# Patient Record
Sex: FEMALE | HISPANIC OR LATINO | Employment: FULL TIME | URBAN - NONMETROPOLITAN AREA
[De-identification: names, ages, dates, MRNs, and addresses within clinical notes are randomized per-mention and may not be internally consistent; named-entity substitution may affect disease eponyms.]

---

## 2017-01-26 ENCOUNTER — OFFICE VISIT (OUTPATIENT)
Dept: MEDICAL GROUP | Facility: PHYSICIAN GROUP | Age: 42
End: 2017-01-26
Payer: COMMERCIAL

## 2017-01-26 ENCOUNTER — TELEPHONE (OUTPATIENT)
Dept: MEDICAL GROUP | Facility: PHYSICIAN GROUP | Age: 42
End: 2017-01-26

## 2017-01-26 VITALS
HEART RATE: 97 BPM | RESPIRATION RATE: 16 BRPM | BODY MASS INDEX: 22.2 KG/M2 | OXYGEN SATURATION: 99 % | SYSTOLIC BLOOD PRESSURE: 158 MMHG | HEIGHT: 64 IN | TEMPERATURE: 97.5 F | WEIGHT: 130 LBS | DIASTOLIC BLOOD PRESSURE: 110 MMHG

## 2017-01-26 DIAGNOSIS — I10 ESSENTIAL HYPERTENSION: ICD-10-CM

## 2017-01-26 PROCEDURE — 99214 OFFICE O/P EST MOD 30 MIN: CPT | Performed by: NURSE PRACTITIONER

## 2017-01-26 RX ORDER — LISINOPRIL 10 MG/1
10 TABLET ORAL DAILY
Qty: 30 TAB | Refills: 2 | Status: SHIPPED | OUTPATIENT
Start: 2017-01-26 | End: 2017-02-24

## 2017-01-26 ASSESSMENT — PATIENT HEALTH QUESTIONNAIRE - PHQ9: CLINICAL INTERPRETATION OF PHQ2 SCORE: 0

## 2017-01-26 NOTE — Clinical Note
January 26, 2017         Patient: Olimpia Dent   YOB: 1975   Date of Visit: 1/26/2017           To Whom it May Concern:    Olimpia Dent was seen in my clinic on 1/26/2017. She may return to work on 1/27/17..    If you have any questions or concerns, please don't hesitate to call.        Sincerely,           GRICELDA Frey.  Electronically Signed

## 2017-01-26 NOTE — ASSESSMENT & PLAN NOTE
This is a 41-year-old Guatemalan-speaking female, the visit is conducted with [facility] .     She was diagnosed with hypertension while she was still living in [place], she cannot recall which medication she was on but it did control her blood pressure well. She stopped taking these medications a few years ago when she moved to the , she was not sure that she needed to continue taking them. She has been having some shortness of breath that has been intermittent, not associated with physical activity, cough, CP.

## 2017-01-26 NOTE — PATIENT INSTRUCTIONS
Lisinopril tablets  ¿Qué es stefan medicamento?  El LISINOPRIL es un inhibidor de la ECA. Stefan medicamento se utiliza para el tratamiento de la alina presión sanguínea o insuficiencia cardiaca. También se utiliza para proteger el corazón inmediatamente después de un ataque cardíaco.  Stefan medicamento puede ser utilizado para otros usos; si tiene alguna pregunta consulte con escamilla proveedor de atención médica o con escamilla farmacéutico.  MARCAS COMERCIALES DISPONIBLES: Prinivil, Zestril  ¿Qué le lance informar a mi profesional de la renea antes de letha stefan medicamento?  Necesita saber si usted presenta alguno de los siguientes problemas o situaciones:  -diabetes  -enfermedad cardiaca o vascular  -enfermedad del sistema inmunológico, esther lupus o esclerodermia  -enfermedad renal  -presión sanguínea baja  -hinchazón previa de la lengua, cecile o labios con dificultad al respirar o tragar, ronquera o estrechamiento de la garganta  -ingrid reacción alérgica o inusual al lisinopril, a otros inhibidores de la ECA, veneno del insecto, alimentos, colorantes o conservantes  -si está embarazada o buscando quedar embarazada  -si está amamantando a un bebé  ¿Cómo lance utilizar stefan medicamento?  [place] stefan medicamento por vía oral con un vaso de agua. Siga las instrucciones de la etiqueta del medicamento. Stefan medicamento se puede letha con o sin alimentos. [place] charmaine dosis a intervalos regulares. No deje de letha stefan medicamento excepto si así lo indica escamilla médico o escamilla profesional de la renea.  Hable con escamilla pediatra para informarse acerca del uso de stefan medicamento en niños. Puede requerir atención especial. Aunque stefan medicamento ha sido recetado a niños tan menores esther de 6 años de edad para condiciones selectivas, las precauciones se aplican.  Sobredosis: Póngase en contacto inmediatamente con un centro toxicológico o ingrid lakisha de urgencia si usted evan que haya tomado demasiado medicamento.  ATENCIÓN: Stefan medicamento es solo para usted.  No comparta stefan medicamento con nadie.  ¿Qué sucede si me olvido de ingrid dosis?  Si olvida ingrid dosis, tómela lo antes posible. Si es anyi la hora de la próxima dosis, tome sólo felipe dosis. No tome dosis adicionales o dobles.  ¿Qué puede interactuar con stefan medicamento?  -diuréticos  -litio  -los LEDA, medicamentos para el dolor o la inflamación, esthre ibuprofeno o naproxeno  -suplementos a base de hierba de venta haja, yonas esther barrera abbott  -suplementos o sales de potasio  -sustitutos de la sal  Puede ser que esta lista no menciona todas las posibles interacciones. Informe a escamilla profesional de la renea de todos los productos a base de hierbas, medicamentos de venta haja o suplementos nutritivos que esté tomando. Si usted fuma, consume bebidas alcohólicas o si utiliza drogas ilegales, indíqueselo también a escamilla profesional de la renea. Algunas sustancias pueden interactuar con escamilla medicamento.  ¿A qué lance estar atento al usar stefan medicamento?  Visite a escamilla médico o a escamilla profesional de la renea para chequear escamilla evolución periódicamente. Controle escamilla presión sanguínea esther le haya indicado. Pregunte a escamilla médico cuál debe ser escamilla presión sanguínea y cuándo deberá comunicarse con él o lam. Comuníquese con escamilla médico o con escamilla profesional de la renea si observa que escamilla pulso cardiaco es rápido o irregular.  Las mujeres deben informar a escamilla médico si están buscando quedar embarazadas o si creen que están embarazadas. Existe la posibilidad de efectos secundarios graves al bebé sin nacer. Para más información hable con escamilla profesional de la renea o escamilla farmacéutico.  Si sufre ataques severos de diarrea, náuseas, vómito o si suda demasiado, consulte a escamilla médico o a escamilla profesional de la renea. La pérdida de líquidos corporales puede hacer que sea peligroso letha stefan medicamento.  Puede experimentar mareos o somnolencia. No conduzca ni utilice maquinaria, ni pepper nada que le exija permanecer en estado de alerta hasta que sepa cómo le  afecta stefan medicamento. No se siente ni se ponga de pie con rapidez, especialmente si es un paciente de edad avanzada. [place] reduce el riesgo de mareos o desmayos. El alcohol puede aumentar los mareos y la somnolencia. Evite consumir bebidas alcohólicas.  Evite los sustitutos de la sal a menos que escamilla médico o escamilla profesional de la renea indique lo contrario.  No se trate usted mismo si tiene tos, resfrío o marion mientras esté tomando stefan medicamento sin consultar a escamilla médico o a escamilla profesional de la renea. Algunos ingredientes pueden aumentar escamilla presión sanguínea.  ¿Qué efectos secundarios puedo tener al utilizar stefan medicamento?  Efectos secundarios que debe informar a escamilla médico o a escamilla profesional de la renea tan pronto esther sea posible:  -dolor abdominal con o sin náuseas o vómito  -reacciones alérgicas esther erupción cutánea o urticarias, hinchazón de la cecile, choco, pies, labios, garganta o lengua  -orina de color amarillo oscuro  -dificultad al respirar  -mareos, aturdimiento o desmayos  -fiebre o dolor de garganta  -pulso cardiaco irregular, dolor en el pecho  -dolor o dificultad para orinar  -enrojecimiento, formación de ampollas, descamación o distensión de la piel, inclusive dentro de la boca  -cansancio inusual  -color amarillento de los ojos o la piel  Efectos secundarios que, por lo general, no requieren atención médica (debe informarlos a escamilla médico o a escamilla profesional de la renea si persisten o si son molestos):  -cambios en el sentido del gusto  -tos  -disminución de la función o deseo sexual  -dolor de veronica  -sensibilidad al sol  -cansancio  Puede ser que esta lista no menciona todos los posibles efectos secundarios. Comuníquese a escamilla médico por asesoramiento médico sobre los efectos secundarios. Usted puede informar los efectos secundarios a la FDA por teléfono al [phone]-[phone].  ¿Dónde lance guardar mi medicina?  [place]ala fuera del alcance de los niños.  Guárdela a temperatura ambiente, entre 15  y 30 grados C (59 y 86 grados F). Protéjala de la humedad. Mantenga el envase israel cerrado. Deseche todo el medicamento que no haya utilizado, después de la fecha de vencimiento.  ATENCIÓN: Anabela folleto es un resumen. Puede ser que no cubra toda la posible información. Si usted tiene preguntas acerca de esta medicina, consulte con escamilla médico, escamilla farmacéutico o escamilla profesional de la renea.  © 2014, Elsevier/Gold Standard. (4/9/2007 3:31:00 PM)

## 2017-01-26 NOTE — MR AVS SNAPSHOT
"        Olimpia Dent   2017 2:20 PM   Office Visit   MRN: [id number]    Department:  [facility]   Dept Phone:  [phone]    Description:  Female : 1975   Provider:  JADEN Frey           Reason for Visit     New Patient est care       Allergies as of 2017     No Known Allergies      You were diagnosed with     Essential hypertension   [[id number]]         Vital Signs     Blood Pressure Pulse Temperature Respirations Height Weight    158/110 mmHg 97 36.4 °C (97.5 °F) 16 1.626 m (5' 4.02\") 58.968 kg (130 lb)    Body Mass Index Oxygen Saturation Last Menstrual Period Breastfeeding? Smoking Status       22.30 kg/m2 99% 2017 No Never Smoker        Basic Information     Date Of Birth Sex Race Ethnicity Preferred Language    1975 Female Unable to Obtain Unknown English      Your appointments     Feb 10, 2017 10:45 AM   Established Patient with JEROME SAUNDERS   [facility]    [address] [place] [id number] [phone]           You will be receiving a confirmation call a few days before your appointment from our automated call confirmation system.            2017  1:00 PM   Established Patient with JADNE Frey   [facility])    [address] [place] [id number] [phone]           You will be receiving a confirmation call a few days before your appointment from our automated call confirmation system.              Health Maintenance        Date Due Completion Dates    IMM DTaP/Tdap/Td Vaccine (1 - Tdap) 1994 ---    PAP SMEAR 1996 ---    MAMMOGRAM 2015 ---    IMM INFLUENZA (1) 2016 ---            Current Immunizations     No immunizations on file.      Below and/or attached are the medications your provider expects you to take. Review all of your home medications and newly ordered medications with your provider and/or pharmacist. Follow " medication instructions as directed by your provider and/or pharmacist. Please keep your medication list with you and share with your provider. Update the information when medications are discontinued, doses are changed, or new medications (including over-the-counter products) are added; and carry medication information at all times in the event of emergency situations     Allergies:  No Known Allergies          Medications  Valid as of: January 26, 2017 -  2:56 PM    Generic Name Brand Name Tablet Size Instructions for use    Benzonatate (Cap) TESSALON 200 MG Take 1 Cap by mouth 3 times a day as needed for Cough.        Lisinopril (Tab) PRINIVIL 10 MG Take 1 Tab by mouth every day.        .                 Medicines prescribed today were sent to:     [facility] [address] [place] [address], [place] [address] [facility] [address] [place] [address] & [place]    [address] [facility] [address] [place] [address]    Phone: [phone] Fax: [phone]    Open 24 Hours?: No      Medication refill instructions:       If your prescription bottle indicates you have medication refills left, it is not necessary to call your provider’s office. Please contact your pharmacy and they will refill your medication.    If your prescription bottle indicates you do not have any refills left, you may request refills at any time through one of the following ways: The online [employer] system (except Urgent Care), by calling your provider’s office, or by asking your pharmacy to contact your provider’s office with a refill request. Medication refills are processed only during regular business hours and may not be available until the next business day. Your provider may request additional information or to have a follow-up visit with you prior to refilling your medication.   *Please Note: Medication refills are assigned a new Rx number when refilled electronically. Your pharmacy may indicate that no refills were authorized even though a new prescription for  the same medication is available at the pharmacy. Please request the medicine by name with the pharmacy before contacting your provider for a refill.        Your To Do List     Future Labs/Procedures Complete By Expires    COMP METABOLIC PANEL  As directed 1/27/2018    TSH  As directed 1/27/2018      Instructions    Lisinopril tablets  ¿Qué es stefan medicamento?  El LISINOPRIL es un inhibidor de la ECA. Stefan medicamento se utiliza para el tratamiento de la alina presión sanguínea o insuficiencia cardiaca. También se utiliza para proteger el corazón inmediatamente después de un ataque cardíaco.  Stefan medicamento puede ser utilizado para otros usos; si tiene alguna pregunta consulte con escamilla proveedor de atención médica o con escamilla farmacéutico.  MARCAS COMERCIALES DISPONIBLES: Prinivil, Zestril  ¿Qué le lance informar a mi profesional de la renea antes de letha stefan medicamento?  Necesita saber si usted presenta alguno de los siguientes problemas o situaciones:  -diabetes  -enfermedad cardiaca o vascular  -enfermedad del sistema inmunológico, esther lupus o esclerodermia  -enfermedad renal  -presión sanguínea baja  -hinchazón previa de la lengua, cecile o labios con dificultad al respirar o tragar, ronquera o estrechamiento de la garganta  -ingrid reacción alérgica o inusual al lisinopril, a otros inhibidores de la ECA, veneno del insecto, alimentos, colorantes o conservantes  -si está embarazada o buscando quedar embarazada  -si está amamantando a un bebé  ¿Cómo lance utilizar stefan medicamento?  [place] stefan medicamento por vía oral con un vaso de agua. Siga las instrucciones de la etiqueta del medicamento. Stefan medicamento se puede letha con o sin alimentos. [place] charmaine dosis a intervalos regulares. No deje de letha stefan medicamento excepto si así lo indica escamilla médico o escamilla profesional de la renea.  Hable con escamilla pediatra para informarse acerca del uso de stefan medicamento en niños. Puede requerir atención especial. Aunque stefan medicamento  ha sido recetado a niños tan menores esther de 6 años de edad para condiciones selectivas, las precauciones se aplican.  Sobredosis: Póngase en contacto inmediatamente con un centro toxicológico o ingrid lakisha de urgencia si usted evan que haya tomado demasiado medicamento.  ATENCIÓN: Stefan medicamento es solo para usted. No comparta stefan medicamento con nadie.  ¿Qué sucede si me olvido de ingrid dosis?  Si olvida ingrid dosis, tómela lo antes posible. Si es anyi la hora de la próxima dosis, tome sólo felipe dosis. No tome dosis adicionales o dobles.  ¿Qué puede interactuar con stefan medicamento?  -diuréticos  -litio  -los LEDA, medicamentos para el dolor o la inflamación, esther ibuprofeno o naproxeno  -suplementos a base de hierba de venta haja, yonas esther barrera abbott  -suplementos o sales de potasio  -sustitutos de la sal  Puede ser que esta lista no menciona todas las posibles interacciones. Informe a escamilla profesional de la renea de todos los productos a base de hierbas, medicamentos de venta haja o suplementos nutritivos que esté tomando. Si usted fuma, consume bebidas alcohólicas o si utiliza drogas ilegales, indíqueselo también a escamilla profesional de la renea. Algunas sustancias pueden interactuar con escamilla medicamento.  ¿A qué lance estar atento al usar stefan medicamento?  Visite a escamilla médico o a escamilla profesional de la renea para chequear escamilla evolución periódicamente. Controle escamilla presión sanguínea esther le haya indicado. Pregunte a escamilla médico cuál debe ser escamilla presión sanguínea y cuándo deberá comunicarse con él o lam. Comuníquese con escamilla médico o con escamilla profesional de la renea si observa que escamilla pulso cardiaco es rápido o irregular.  Las mujeres deben informar a escamilla médico si están buscando quedar embarazadas o si creen que están embarazadas. Existe la posibilidad de efectos secundarios graves al bebé sin nacer. Para más información hable con escamilla profesional de la renea o escamilla farmacéutico.  Si sufre ataques severos de diarrea, náuseas, vómito o  si suda demasiado, consulte a escamilla médico o a escamilla profesional de la renea. La pérdida de líquidos corporales puede hacer que sea peligroso letha stefan medicamento.  Puede experimentar mareos o somnolencia. No conduzca ni utilice maquinaria, ni pepper nada que le exija permanecer en estado de alerta hasta que sepa cómo le afecta stefan medicamento. No se siente ni se ponga de pie con rapidez, especialmente si es un paciente de edad avanzada. [place] reduce el riesgo de mareos o desmayos. El alcohol puede aumentar los mareos y la somnolencia. Evite consumir bebidas alcohólicas.  Evite los sustitutos de la sal a menos que escamilla médico o escamilla profesional de la renea indique lo contrario.  No se trate usted mismo si tiene tos, resfrío o marion mientras esté tomando stefan medicamento sin consultar a escamilla médico o a escamilla profesional de la renea. Algunos ingredientes pueden aumentar escamilla presión sanguínea.  ¿Qué efectos secundarios puedo tener al utilizar stefan medicamento?  Efectos secundarios que debe informar a escamilla médico o a escamilla profesional de la renea tan pronto esther sea posible:  -dolor abdominal con o sin náuseas o vómito  -reacciones alérgicas esther erupción cutánea o urticarias, hinchazón de la cecile, choco, pies, labios, garganta o lengua  -orina de color amarillo oscuro  -dificultad al respirar  -mareos, aturdimiento o desmayos  -fiebre o dolor de garganta  -pulso cardiaco irregular, dolor en el pecho  -dolor o dificultad para orinar  -enrojecimiento, formación de ampollas, descamación o distensión de la piel, inclusive dentro de la boca  -cansancio inusual  -color amarillento de los ojos o la piel  Efectos secundarios que, por lo general, no requieren atención médica (debe informarlos a escamilla médico o a escamilla profesional de la renea si persisten o si son molestos):  -cambios en el sentido del gusto  -tos  -disminución de la función o deseo sexual  -dolor de veronica  -sensibilidad al sol  -cansancio  Puede ser que esta lista no menciona todos los  posibles efectos secundarios. Comuníquese a escamilla médico por asesoramiento médico sobre los efectos secundarios. Usted puede informar los efectos secundarios a la FDA por teléfono al 1-945-FDA-9046.  ¿Dónde lance guardar mi medicina?  [place] fuera del alcance de los niños.  [place] a temperatura ambiente, entre 15 y 30 grados C (59 y 86 grados F). Protéjala de la humedad. Mantenga el envase israel cerrado. Deseche todo el medicamento que no haya utilizado, después de la fecha de vencimiento.  ATENCIÓN: Anabela folleto es un resumen. Puede ser que no cubra toda la posible información. Si usted tiene preguntas acerca de esta medicina, consulte con escamilla médico, escamilla farmacéutico o escamilla profesional de la renea.  © 2014, Elsevier/Gold Standard. (4/9/2007 3:31:00 PM)            [employer] Access Code: [email or web address]  Expires: 2/25/2017 10:25 AM    [employer]  A secure, online tool to manage your health information     [employer]® is a secure, online tool that connects you to your personalized health information from the privacy of your home -- day or night - making it very easy for you to manage your healthcare. Once the activation process is completed, you can even access your medical information using the [employer] leighann, which is available for free in the Apple Leighann store or Google Play store.     [employer] provides the following levels of access (as shown below):   My Chart Features   [facility] Doctor [facility]  Specialists [facility]  Urgent  Care [facility]  Primary Care  Doctor   Email your healthcare team securely and privately 24/7 X X X    Manage appointments: schedule your next appointment; view details of past/upcoming appointments X      Request prescription refills. X      View recent personal medical records, including lab and immunizations X X X X   View health record, including health history, allergies, medications X X X X   Read reports about your outpatient visits, procedures, consult and ER notes X X X X    See your discharge summary, which is a recap of your hospital and/or ER visit that includes your diagnosis, lab results, and care plan. X X       How to register for [employer]:  1. Go to  [email or web address].  2. Click on the Sign Up Now box, which takes you to the New Member Sign Up page. You will need to provide the following information:  a. Enter your [employer] Access Code exactly as it appears at the top of this page. (You will not need to use this code after you’ve completed the sign-up process. If you do not sign up before the expiration date, you must request a new code.)   b. Enter your date of birth.   c. Enter your home email address.   d. Click Submit, and follow the next screen’s instructions.  3. Create a [employer] ID. This will be your [employer] login ID and cannot be changed, so think of one that is secure and easy to remember.  4. Create a [employer] password. You can change your password at any time.  5. Enter your Password Reset Question and Answer. This can be used at a later time if you forget your password.   6. Enter your e-mail address. This allows you to receive e-mail notifications when new information is available in [employer].  7. Click Sign Up. You can now view your health information.    For assistance activating your [employer] account, call [phone]

## 2017-01-27 NOTE — PROGRESS NOTES
[facility]  Primary Care Office Visit - Problem-Oriented        History:     Olimpia Dent is a 41 y.o. female who is here today to discuss New Patient      Essential hypertension  This is a 41-year-old Haitian-speaking female, the visit is conducted with [facility] .     She was diagnosed with hypertension while she was still living in [place], she cannot recall which medication she was on but it did control her blood pressure well. She stopped taking these medications a few years ago when she moved to the , she was not sure that she needed to continue taking them. She has been having some shortness of breath that has been intermittent, not associated with physical activity, cough, CP.           Past Medical History   Diagnosis Date   • Hypertension      History reviewed. No pertinent past surgical history.  Social History     Social History   • Marital Status:      Spouse Name: N/A   • Number of Children: N/A   • Years of Education: N/A     Occupational History   • Not on file.     Social History Main Topics   • Smoking status: Never Smoker    • Smokeless tobacco: Not on file   • Alcohol Use: No   • Drug Use: No   • Sexual Activity: Yes     Other Topics Concern   • Not on file     Social History Narrative     History   Smoking status   • Never Smoker    Smokeless tobacco   • Not on file     History reviewed. No pertinent family history.  No Known Allergies    Problem List:     Patient Active Problem List    Diagnosis Date Noted   • Essential hypertension 01/26/2017         Medications:     Current outpatient prescriptions:   •  lisinopril (PRINIVIL) 10 MG Tab, Take 1 Tab by mouth every day., Disp: 30 Tab, Rfl: 2  •  benzonatate (TESSALON) 200 MG capsule, Take 1 Cap by mouth 3 times a day as needed for Cough., Disp: 60 Cap, Rfl: 0      Review of Systems:     (positives in bold), all other systems reviewed and WNL     PULM: SOB, wheezing, cough, sputum production,  "hemoptysis      Physical Assessment:     VS: /110 mmHg  Pulse 97  Temp(Src) 36.4 °C (97.5 °F)  Resp 16  Ht 1.626 m (5' 4.02\")  Wt 58.968 kg (130 lb)  BMI 22.30 kg/m2  SpO2 99%  LMP 01/23/2017  Breastfeeding? No    General: Well-developed, well-nourished female     Head: PERRL, EOMI. Normocephalic No facial asymmetry noted.  Cardiovasc:No JVD.  RRR, no MRG. No thrills or bruits. Pulses 2+ and symmetric at all distal extremities.  Pulmonary: Lungs clear bilaterally.  Normal respiratory effort. No wheeze or crackles.   Extremities: No edema, no TTP bilateral calves. Pedal pulses intact. No joint effusions. LEs warm and well-perfused.  Neuro: Alert and oriented  Skin:No rashes noted. Skin warm, dry, intact    Psych: Dressed appropriately for the weather, pleasant and conversant.  Affect, mood & judgment appropriate.    Assessment/Plan:   Olimpia was seen today for new patient.    Diagnoses and all orders for this visit:    Essential hypertension, chronic, uncontrolled   - Start lisinopril 10 mg daily, follow-up in 2 weeks for blood pressure check and labs. Follow up in 1 month, sooner if needed     -     lisinopril (PRINIVIL) 10 MG Tab; Take 1 Tab by mouth every day.  -     COMP METABOLIC PANEL; Future  -     TSH; Future    Patient is agreeable to the above plan and voiced understanding. All questions answered.   Spent 25min face-to- face, >50% spent on counseling & patient education.  Please note that this dictation was created using voice recognition software. I have made every reasonable attempt to correct obvious errors, but I expect that there are errors of grammar and possibly content that I did not discover before finalizing the note.    ERMA Castro  1/26/2017, 4:10 PM    "

## 2017-02-10 ENCOUNTER — TELEPHONE (OUTPATIENT)
Dept: MEDICAL GROUP | Facility: PHYSICIAN GROUP | Age: 42
End: 2017-02-10

## 2017-02-10 ENCOUNTER — NON-PROVIDER VISIT (OUTPATIENT)
Dept: MEDICAL GROUP | Facility: PHYSICIAN GROUP | Age: 42
End: 2017-02-10
Payer: COMMERCIAL

## 2017-02-10 ENCOUNTER — HOSPITAL ENCOUNTER (OUTPATIENT)
Dept: LAB | Facility: MEDICAL CENTER | Age: 42
End: 2017-02-10
Attending: NURSE PRACTITIONER
Payer: COMMERCIAL

## 2017-02-10 VITALS — SYSTOLIC BLOOD PRESSURE: 140 MMHG | DIASTOLIC BLOOD PRESSURE: 84 MMHG

## 2017-02-10 DIAGNOSIS — I10 ESSENTIAL HYPERTENSION: ICD-10-CM

## 2017-02-10 LAB
ALBUMIN SERPL BCP-MCNC: 4.5 G/DL (ref 3.2–4.9)
ALBUMIN/GLOB SERPL: 1.5 G/DL
ALP SERPL-CCNC: 62 U/L (ref 30–99)
ALT SERPL-CCNC: 12 U/L (ref 2–50)
ANION GAP SERPL CALC-SCNC: 8 MMOL/L (ref 0–11.9)
AST SERPL-CCNC: 20 U/L (ref 12–45)
BILIRUB SERPL-MCNC: 0.9 MG/DL (ref 0.1–1.5)
BUN SERPL-MCNC: 13 MG/DL (ref 8–22)
CALCIUM SERPL-MCNC: 9.7 MG/DL (ref 8.5–10.5)
CHLORIDE SERPL-SCNC: 100 MMOL/L (ref 96–112)
CO2 SERPL-SCNC: 29 MMOL/L (ref 20–33)
CREAT SERPL-MCNC: 0.77 MG/DL (ref 0.5–1.4)
GLOBULIN SER CALC-MCNC: 3.1 G/DL (ref 1.9–3.5)
GLUCOSE SERPL-MCNC: 86 MG/DL (ref 65–99)
POTASSIUM SERPL-SCNC: 3.9 MMOL/L (ref 3.6–5.5)
PROT SERPL-MCNC: 7.6 G/DL (ref 6–8.2)
SODIUM SERPL-SCNC: 137 MMOL/L (ref 135–145)
TSH SERPL DL<=0.005 MIU/L-ACNC: 0.62 UIU/ML (ref 0.3–3.7)

## 2017-02-10 PROCEDURE — 80053 COMPREHEN METABOLIC PANEL: CPT

## 2017-02-10 PROCEDURE — 84443 ASSAY THYROID STIM HORMONE: CPT

## 2017-02-10 PROCEDURE — 36415 COLL VENOUS BLD VENIPUNCTURE: CPT

## 2017-02-10 NOTE — MR AVS SNAPSHOT
Olimpiadonavan Dent   2/10/2017 9:00 AM   Non-Provider Visit   MRN: [id number]    Department:  [facility]   Dept Phone:  [phone]    Description:  Female : 1975   Provider:  JEROME SAUNDERS           Reason for Visit     Hypertension           Allergies as of 2/10/2017     No Known Allergies      Vital Signs     Blood Pressure Last Menstrual Period Smoking Status             140/84 mmHg 2017 Never Smoker          Basic Information     Date Of Birth Sex Race Ethnicity Preferred Language    1975 Female Unable to Obtain Unknown English      Your appointments     Feb 10, 2017  9:00 AM   Established Patient with JEROME SAUNDERS   [facility] [place]    [address] [address] [phone]           You will be receiving a confirmation call a few days before your appointment from our automated call confirmation system.            2017  1:00 PM   Established Patient with JADEN Frey   [facility]    [address] [place] [address] [phone]           You will be receiving a confirmation call a few days before your appointment from our automated call confirmation system.              Problem List              ICD-10-CM Priority Class Noted - Resolved    Essential hypertension I10   2017 - Present      Health Maintenance        Date Due Completion Dates    IMM DTaP/Tdap/Td Vaccine (1 - Tdap) 1994 ---    PAP SMEAR 1996 ---    MAMMOGRAM 2015 ---    IMM INFLUENZA (1) 2016 ---            Current Immunizations     No immunizations on file.      Below and/or attached are the medications your provider expects you to take. Review all of your home medications and newly ordered medications with your provider and/or pharmacist. Follow medication instructions as directed by your provider and/or pharmacist. Please keep your medication list with you and share with your  provider. Update the information when medications are discontinued, doses are changed, or new medications (including over-the-counter products) are added; and carry medication information at all times in the event of emergency situations     Allergies:  No Known Allergies          Medications  Valid as of: February 10, 2017 -  8:57 AM    Generic Name Brand Name Tablet Size Instructions for use    Benzonatate (Cap) TESSALON 200 MG Take 1 Cap by mouth 3 times a day as needed for Cough.        Lisinopril (Tab) PRINIVIL 10 MG Take 1 Tab by mouth every day.        .                 Medicines prescribed today were sent to:     [facility] [address] [place] [address], [place] - [address] [facility] [address] [facility] [address] [place]    [address] [facility] [facility] [address] [place] [address]    Phone: [phone] Fax: [phone]    Open 24 Hours?: No      Medication refill instructions:       If your prescription bottle indicates you have medication refills left, it is not necessary to call your provider’s office. Please contact your pharmacy and they will refill your medication.    If your prescription bottle indicates you do not have any refills left, you may request refills at any time through one of the following ways: The online [employer] system (except Urgent Care), by calling your provider’s office, or by asking your pharmacy to contact your provider’s office with a refill request. Medication refills are processed only during regular business hours and may not be available until the next business day. Your provider may request additional information or to have a follow-up visit with you prior to refilling your medication.   *Please Note: Medication refills are assigned a new Rx number when refilled electronically. Your pharmacy may indicate that no refills were authorized even though a new prescription for the same medication is available at the pharmacy. Please request the medicine by name with the pharmacy before contacting your provider  for a refill.           [employer] Access Code: [email or web address]  Expires: 2/25/2017 10:25 AM    [employer]  A secure, online tool to manage your health information     [employer]® is a secure, online tool that connects you to your personalized health information from the privacy of your home -- day or night - making it very easy for you to manage your healthcare. Once the activation process is completed, you can even access your medical information using the [employer] leighann, which is available for free in the Apple Leighann store or Google Play store.     [employer] provides the following levels of access (as shown below):   My Chart Features   [facility] Primary Care Doctor [facility]  Specialists [facility]  Urgent  Care [facility]  Primary Care  Doctor   Email your healthcare team securely and privately 24/7 X X X    Manage appointments: schedule your next appointment; view details of past/upcoming appointments X      Request prescription refills. X      View recent personal medical records, including lab and immunizations X X X X   View health record, including health history, allergies, medications X X X X   Read reports about your outpatient visits, procedures, consult and ER notes X X X X   See your discharge summary, which is a recap of your hospital and/or ER visit that includes your diagnosis, lab results, and care plan. X X       How to register for [employer]:  1. Go to  [email or web address].  2. Click on the Sign Up Now box, which takes you to the New Member Sign Up page. You will need to provide the following information:  a. Enter your [employer] Access Code exactly as it appears at the top of this page. (You will not need to use this code after you’ve completed the sign-up process. If you do not sign up before the expiration date, you must request a new code.)   b. Enter your date of birth.   c. Enter your home email address.   d. Click Submit, and follow the next screen’s instructions.  3. Create a [employer] ID.  This will be your [employer]Place login ID and cannot be changed, so think of one that is secure and easy to remember.  4. Create a [employer]Place password. You can change your password at any time.  5. Enter your Password Reset Question and Answer. This can be used at a later time if you forget your password.   6. Enter your e-mail address. This allows you to receive e-mail notifications when new information is available in [employer]Place.  7. Click Sign Up. You can now view your health information.    For assistance activating your [employer] account, call [phone]

## 2017-02-10 NOTE — TELEPHONE ENCOUNTER
Olimpia Filipe is a 41 y.o. female here for a non-provider visit for bp check     Filed Vitals:    02/10/17 0851   BP: 140/84     If abnormal, was an in office provider notified today? Yes  Routed to PCP? Yes

## 2017-02-24 ENCOUNTER — OFFICE VISIT (OUTPATIENT)
Dept: MEDICAL GROUP | Facility: PHYSICIAN GROUP | Age: 42
End: 2017-02-24
Payer: COMMERCIAL

## 2017-02-24 VITALS
WEIGHT: 122 LBS | SYSTOLIC BLOOD PRESSURE: 108 MMHG | DIASTOLIC BLOOD PRESSURE: 64 MMHG | OXYGEN SATURATION: 98 % | HEART RATE: 94 BPM | TEMPERATURE: 97.3 F | HEIGHT: 64 IN | BODY MASS INDEX: 20.83 KG/M2

## 2017-02-24 DIAGNOSIS — Z00.00 HEALTHCARE MAINTENANCE: ICD-10-CM

## 2017-02-24 DIAGNOSIS — Z12.31 SCREENING MAMMOGRAM, ENCOUNTER FOR: ICD-10-CM

## 2017-02-24 DIAGNOSIS — Z80.3 FAMILY HISTORY OF BREAST CANCER IN FIRST DEGREE RELATIVE: ICD-10-CM

## 2017-02-24 DIAGNOSIS — I10 ESSENTIAL HYPERTENSION: ICD-10-CM

## 2017-02-24 PROCEDURE — 99213 OFFICE O/P EST LOW 20 MIN: CPT | Performed by: NURSE PRACTITIONER

## 2017-02-24 RX ORDER — LISINOPRIL 5 MG/1
5 TABLET ORAL DAILY
Qty: 30 TAB | Refills: 2 | Status: SHIPPED | OUTPATIENT
Start: 2017-02-24 | End: 2017-05-27 | Stop reason: SDUPTHER

## 2017-02-24 ASSESSMENT — PAIN SCALES - GENERAL: PAINLEVEL: NO PAIN

## 2017-02-24 NOTE — MR AVS SNAPSHOT
"        Olimpia MorenoTello   2017 1:00 PM   Office Visit   MRN: [id number]    Department:  [facility]   Dept Phone:  [phone]    Description:  Female : 1975   Provider:  JADEN Frey           Reason for Visit     Follow-Up bp meds.       Allergies as of 2017     No Known Allergies      You were diagnosed with     Essential hypertension   [2637101]       Family history of breast cancer in first degree relative   [517019]       Screening mammogram, encounter for   [[id number]]         Vital Signs     Blood Pressure Pulse Temperature Height Weight Body Mass Index    108/64 mmHg 94 36.3 °C (97.3 °F) 1.626 m (5' 4\") 55.339 kg (122 lb) 20.93 kg/m2    Oxygen Saturation Last Menstrual Period Smoking Status             98% 2017 Never Smoker          Basic Information     Date Of Birth Sex Race Ethnicity Preferred Language    1975 Female Unable to Obtain Unknown English      Your appointments     May 26, 2017  3:20 PM   Established Patient with JADEN Frey   [address] [id number] [phone]           You will be receiving a confirmation call a few days before your appointment from our automated call confirmation system.              Problem List              ICD-10-CM Priority Class Noted - Resolved    Essential hypertension I10   2017 - Present    Family history of breast cancer in first degree relative Z80.3   2017 - Present      Health Maintenance        Date Due Completion Dates    IMM DTaP/Tdap/Td Vaccine (1 - Tdap) 1994 ---    PAP SMEAR 1996 ---    MAMMOGRAM 2015 ---    IMM INFLUENZA (1) 2016 ---            Current Immunizations     No immunizations on file.      Below and/or attached are the medications your provider expects you to take. Review all of your home medications and newly ordered medications with your provider and/or pharmacist. Follow " medication instructions as directed by your provider and/or pharmacist. Please keep your medication list with you and share with your provider. Update the information when medications are discontinued, doses are changed, or new medications (including over-the-counter products) are added; and carry medication information at all times in the event of emergency situations     Allergies:  No Known Allergies          Medications  Valid as of: February 24, 2017 -  1:30 PM    Generic Name Brand Name Tablet Size Instructions for use    Benzonatate (Cap) TESSALON 200 MG Take 1 Cap by mouth 3 times a day as needed for Cough.        Lisinopril (Tab) PRINIVIL 5 MG Take 1 Tab by mouth every day.        .                 Medicines prescribed today were sent to:     [facility] [address] [place] [address], [place] - [address] [facility] [address] [place] [address] & [place]    [address] [facility] [address] [place] [address]    Phone: [phone] Fax: [phone]    Open 24 Hours?: No      Medication refill instructions:       If your prescription bottle indicates you have medication refills left, it is not necessary to call your provider’s office. Please contact your pharmacy and they will refill your medication.    If your prescription bottle indicates you do not have any refills left, you may request refills at any time through one of the following ways: The online [employer] system (except Urgent Care), by calling your provider’s office, or by asking your pharmacy to contact your provider’s office with a refill request. Medication refills are processed only during regular business hours and may not be available until the next business day. Your provider may request additional information or to have a follow-up visit with you prior to refilling your medication.   *Please Note: Medication refills are assigned a new Rx number when refilled electronically. Your pharmacy may indicate that no refills were authorized even though a new prescription for  the same medication is available at the pharmacy. Please request the medicine by name with the pharmacy before contacting your provider for a refill.        Your To Do List     Future Labs/Procedures Complete By Expires    MA-SCREEN MAMMO W/CAD-BILAT  As directed 2/24/2018         marker.to Access Code: [email or web address]  Expires: 2/25/2017 10:25 AM    marker.to  A secure, online tool to manage your health information     [employer]’s marker.to® is a secure, online tool that connects you to your personalized health information from the privacy of your home -- day or night - making it very easy for you to manage your healthcare. Once the activation process is completed, you can even access your medical information using the marker.to leighann, which is available for free in the Apple Leighann store or Google Play store.     marker.to provides the following levels of access (as shown below):   My Chart Features   [facility] Primary Care Doctor [facility]  Specialists [facility]  Urgent  Care Non[facility]  Primary Care  Doctor   Email your healthcare team securely and privately 24/7 X X X    Manage appointments: schedule your next appointment; view details of past/upcoming appointments X      Request prescription refills. X      View recent personal medical records, including lab and immunizations X X X X   View health record, including health history, allergies, medications X X X X   Read reports about your outpatient visits, procedures, consult and ER notes X X X X   See your discharge summary, which is a recap of your hospital and/or ER visit that includes your diagnosis, lab results, and care plan. X X       How to register for marker.to:  1. Go to  [email or web address].  2. Click on the Sign Up Now box, which takes you to the New Member Sign Up page. You will need to provide the following information:  a. Enter your marker.to Access Code exactly as it appears at the top of this page. (You will not need to use this code after you’ve completed  the sign-up process. If you do not sign up before the expiration date, you must request a new code.)   b. Enter your date of birth.   c. Enter your home email address.   d. Click Submit, and follow the next screen’s instructions.  3. Create a [employer] ID. This will be your [employer] life renalt login ID and cannot be changed, so think of one that is secure and easy to remember.  4. Create a [employer] password. You can change your password at any time.  5. Enter your Password Reset Question and Answer. This can be used at a later time if you forget your password.   6. Enter your e-mail address. This allows you to receive e-mail notifications when new information is available in [employer].  7. Click Sign Up. You can now view your health information.    For assistance activating your [employer] account, call [phone]

## 2017-02-24 NOTE — ASSESSMENT & PLAN NOTE
This is a 41-year-old Finnish-speaking female, the visit is conducted with [facility] . Patient is here for follow-up, she continues to take lisinopril 10 mg daily. She is monitoring her blood pressure at home 2-3 times a day, systolic readings are in the 90s to mid 100s. She has not had any fatigue or syncope. She is working out daily, she has lost over 8 pounds since her last visit in January. She has completely changed her diet, increased fruits and vegetables. She denies chest pain, shortness of breath, headaches, change in vision. We discussed decreasing lisinopril to 5 mg daily and following up in 3 months, she will likely be able to discontinue this medication if she continues with diet and exercise as above.

## 2017-02-24 NOTE — Clinical Note
February 24, 2017         Patient: Olimpia Dent   YOB: 1975   Date of Visit: 2/24/2017           To Whom it May Concern:    Olimpia Dent was seen in my clinic on 2/24/2017. She may return to work.    If you have any questions or concerns, please don't hesitate to call.        Sincerely,           GRICELDA Frey.  Electronically Signed

## 2017-02-25 NOTE — PROGRESS NOTES
[facility]  Primary Care Office Visit - Problem-Oriented        History:     Olimpia Dent is a 41 y.o. female who is here today to discuss Follow-Up      Essential hypertension  This is a 41-year-old Guatemalan-speaking female, the visit is conducted with [facility] . Patient is here for follow-up, she continues to take lisinopril 10 mg daily. She is monitoring her blood pressure at home 2-3 times a day, systolic readings are in the 90s to mid 100s. She has not had any fatigue or syncope. She is working out daily, she has lost over 8 pounds since her last visit in January. She has completely changed her diet, increased fruits and vegetables. She denies chest pain, shortness of breath, headaches, change in vision. We discussed decreasing lisinopril to 5 mg daily and following up in 3 months, she will likely be able to discontinue this medication if she continues with diet and exercise as above.     Healthcare maintenance  Patient is due for Pap smear in 2018, last test was 2 years ago normal. She does have a family history of breast cancer, her sister was diagnosed many years ago and underwent lumpectomy, unsure of any additional treatment.          Past Medical History   Diagnosis Date   • Hypertension      History reviewed. No pertinent past surgical history.  Social History     Social History   • Marital Status:      Spouse Name: N/A   • Number of Children: N/A   • Years of Education: N/A     Occupational History   • Not on file.     Social History Main Topics   • Smoking status: Never Smoker    • Smokeless tobacco: Not on file   • Alcohol Use: No   • Drug Use: No   • Sexual Activity: Yes     Other Topics Concern   • Not on file     Social History Narrative     History   Smoking status   • Never Smoker    Smokeless tobacco   • Not on file     History reviewed. No pertinent family history.  No Known Allergies    Problem List:     Patient Active Problem List    Diagnosis Date  "Noted   • Family history of breast cancer in first degree relative 02/24/2017   • Healthcare maintenance 02/24/2017   • Essential hypertension 01/26/2017         Medications:     Current outpatient prescriptions:   •  lisinopril (PRINIVIL) 5 MG Tab, Take 1 Tab by mouth every day., Disp: 30 Tab, Rfl: 2  •  benzonatate (TESSALON) 200 MG capsule, Take 1 Cap by mouth 3 times a day as needed for Cough., Disp: 60 Cap, Rfl: 0      Review of Systems:     Comprehensive review of systems negative.    Physical Assessment:     VS: /64 mmHg  Pulse 94  Temp(Src) 36.3 °C (97.3 °F)  Ht 1.626 m (5' 4\")  Wt 55.339 kg (122 lb)  BMI 20.93 kg/m2  SpO2 98%  LMP 01/23/2017    General: Well-developed, well-nourished female     Head: PERRL, EOMI. Normocephalic. No facial asymmetry noted.  Cardiovasc:No JVD.  RRR, no MRG. No thrills or bruits. Pulses 2+ and symmetric at all distal extremities.  Pulmonary: Lungs clear bilaterally.  Normal respiratory effort. No wheeze or crackles.   Extremities: No edema, no TTP bilateral calves. Pedal pulses intact. No joint effusions. LEs warm and well-perfused.  Neuro: Alert and oriented  Skin:No rashes noted. Skin warm, dry, intact    Psych: Dressed appropriately for the weather, pleasant and conversant.  Affect, mood & judgment appropriate.      Assessment/Plan:   Olimpia was seen today for follow-up.    Diagnoses and all orders for this visit:    Essential hypertension, ongoing, improving  - Decrease lisinopril to 5 mg daily, continue strict dietary modifications and daily exercise. We discussed trial discontinuation of follow-up providing she continues with dietary modifications and exercise as per HPI.   - Follow up in 3 months, sooner if needed  -     lisinopril (PRINIVIL) 5 MG Tab; Take 1 Tab by mouth every day.    Family history of breast cancer in first degree relative  -     MA-SCREEN MAMMO W/CAD-BILAT; Future    Screening mammogram, encounter for  -     MA-SCREEN MAMMO W/CAD-BILAT; " Future    Healthcare maintenance  - PAP due in 2018       Patient is agreeable to the above plan and voiced understanding. All questions answered.     Please note that this dictation was created using voice recognition software. I have made every reasonable attempt to correct obvious errors, but I expect that there are errors of grammar and possibly content that I did not discover before finalizing the note.      ERMA Castro  2/24/2017, 5:11 PM

## 2017-02-25 NOTE — ASSESSMENT & PLAN NOTE
Patient is due for Pap smear in 2018, last test was 2 years ago normal. She does have a family history of breast cancer, her sister was diagnosed many years ago and underwent lumpectomy, unsure of any additional treatment.

## 2017-05-30 RX ORDER — LISINOPRIL 5 MG/1
TABLET ORAL
Qty: 90 TAB | Refills: 1 | Status: SHIPPED | OUTPATIENT
Start: 2017-05-30 | End: 2017-11-21 | Stop reason: SDUPTHER

## 2017-05-30 NOTE — TELEPHONE ENCOUNTER
Refill X 6 months, sent to pharmacy.Pt. Seen in the last 6 months per protocol.   Lab Results   Component Value Date/Time    SODIUM 137 02/10/2017 09:00 AM    POTASSIUM 3.9 02/10/2017 09:00 AM    CHLORIDE 100 02/10/2017 09:00 AM    CO2 29 02/10/2017 09:00 AM    GLUCOSE 86 02/10/2017 09:00 AM    BUN 13 02/10/2017 09:00 AM    CREATININE 0.77 02/10/2017 09:00 AM

## 2017-05-30 NOTE — TELEPHONE ENCOUNTER
Was the patient seen in the last year in this department? Yes     Does patient have an active prescription for medications requested? No     Received Request Via: Pharmacy      Pt met protocol?: Yes, OV 2/17   BP Readings from Last 1 Encounters:   02/24/17 108/64

## 2017-07-14 ENCOUNTER — APPOINTMENT (OUTPATIENT)
Dept: RADIOLOGY | Facility: MEDICAL CENTER | Age: 42
End: 2017-07-14
Attending: NURSE PRACTITIONER
Payer: COMMERCIAL

## 2017-12-08 ENCOUNTER — HOSPITAL ENCOUNTER (OUTPATIENT)
Facility: MEDICAL CENTER | Age: 42
End: 2017-12-08
Payer: COMMERCIAL

## 2017-12-08 LAB
ALBUMIN SERPL BCP-MCNC: 4.6 G/DL (ref 3.2–4.9)
ALBUMIN/GLOB SERPL: 1.7 G/DL
ALP SERPL-CCNC: 53 U/L (ref 30–99)
ALT SERPL-CCNC: 12 U/L (ref 2–50)
ANION GAP SERPL CALC-SCNC: 5 MMOL/L (ref 0–11.9)
AST SERPL-CCNC: 18 U/L (ref 12–45)
BILIRUB SERPL-MCNC: 0.7 MG/DL (ref 0.1–1.5)
BUN SERPL-MCNC: 12 MG/DL (ref 8–22)
CALCIUM SERPL-MCNC: 9.6 MG/DL (ref 8.5–10.5)
CHLORIDE SERPL-SCNC: 104 MMOL/L (ref 96–112)
CHOLEST SERPL-MCNC: 180 MG/DL (ref 100–199)
CO2 SERPL-SCNC: 29 MMOL/L (ref 20–33)
CREAT SERPL-MCNC: 0.61 MG/DL (ref 0.5–1.4)
GFR SERPL CREATININE-BSD FRML MDRD: >60 ML/MIN/1.73 M 2
GLOBULIN SER CALC-MCNC: 2.7 G/DL (ref 1.9–3.5)
GLUCOSE SERPL-MCNC: 91 MG/DL (ref 65–99)
HDLC SERPL-MCNC: 67 MG/DL
LDLC SERPL CALC-MCNC: 102 MG/DL
POTASSIUM SERPL-SCNC: 3.9 MMOL/L (ref 3.6–5.5)
PROT SERPL-MCNC: 7.3 G/DL (ref 6–8.2)
SODIUM SERPL-SCNC: 138 MMOL/L (ref 135–145)
TRIGL SERPL-MCNC: 55 MG/DL (ref 0–149)

## 2018-05-27 NOTE — TELEPHONE ENCOUNTER
Was the patient seen in the last year in this department? Yes lOV 2/24/17    Does patient have an active prescription for medications requested? No     Received Request Via: Pharmacy

## 2018-05-29 RX ORDER — LISINOPRIL 5 MG/1
TABLET ORAL
Qty: 90 TAB | Refills: 1 | Status: SHIPPED | OUTPATIENT
Start: 2018-05-29

## 2018-09-27 ENCOUNTER — NON-PROVIDER VISIT (OUTPATIENT)
Dept: URGENT CARE | Facility: CLINIC | Age: 43
End: 2018-09-27

## 2018-09-27 DIAGNOSIS — Z02.1 PRE-EMPLOYMENT DRUG SCREENING: ICD-10-CM

## 2018-09-27 LAB
AMP AMPHETAMINE: NORMAL
COC COCAINE: NORMAL
INT CON NEG: NORMAL
INT CON POS: NORMAL
MET METHAMPHETAMINES: NORMAL
OPI OPIATES: NORMAL
PCP PHENCYCLIDINE: NORMAL
POC DRUG COMMENT 753798-OCCUPATIONAL HEALTH: NEGATIVE
THC: NORMAL

## 2018-09-27 PROCEDURE — 80305 DRUG TEST PRSMV DIR OPT OBS: CPT | Performed by: FAMILY MEDICINE

## 2019-06-12 ENCOUNTER — OCCUPATIONAL MEDICINE (OUTPATIENT)
Dept: URGENT CARE | Facility: PHYSICIAN GROUP | Age: 44
End: 2019-06-12
Payer: COMMERCIAL

## 2019-06-12 VITALS
OXYGEN SATURATION: 97 % | HEIGHT: 64 IN | HEART RATE: 78 BPM | WEIGHT: 135 LBS | TEMPERATURE: 98.4 F | BODY MASS INDEX: 23.05 KG/M2 | SYSTOLIC BLOOD PRESSURE: 110 MMHG | RESPIRATION RATE: 16 BRPM | DIASTOLIC BLOOD PRESSURE: 80 MMHG

## 2019-06-12 DIAGNOSIS — S39.012A STRAIN OF LUMBAR REGION, INITIAL ENCOUNTER: ICD-10-CM

## 2019-06-12 PROCEDURE — 99214 OFFICE O/P EST MOD 30 MIN: CPT | Mod: 29 | Performed by: PHYSICIAN ASSISTANT

## 2019-06-12 NOTE — LETTER
"[facility] [place] [address]  Phone:  [phone] - Fax:  [phone]   [facility] Progress Report and Disability Certification  Date of Service: 6/12/2019   No Show:  No  Date / Time of Next Visit: 6/19/2019   Claim Information   Patient Name: Olimpia Dent  Claim Number:     Employer: [employer]  Date of Injury: 6/10/2019     Insurer / TPA: [employer]  ID / SSN:     Occupation: Picking   Diagnosis: The encounter diagnosis was Strain of lumbar region, initial encounter.    Medical Information   Related to Industrial Injury? Yes    Subjective Complaints:  DOI: 6/10/19, in the afternoon  Patient was lifting a very heavy tote when she developed mild pain across the entire low back. Worsening pain yesterday while working. Denies saddle anesthesia, bowel/bladder incontinence, extremity weakness.  Triggers: sitting, bending over  Relievers: standing improves pain slightly; \"fetal position\"; cold compress  Other places of employment: none  History of back issues: none  OTC Meds tried: APAP helps a little     Objective Findings: Back: Decreased ROM in all planes due to pain.  Extremities: No motor/sensory deficit noted BLE. Strong plantar flexion/dorsiflexion.   Pre-Existing Condition(s):     Assessment:   Initial Visit    Status: Additional Care Required  Permanent Disability:No    Plan: Medication  Comments:OTC only    Diagnostics:      Comments:  Assessment/Plan:  Recommend continuing ice and APAP as it appears to be helping. Would consider trying heat, epsom salt baths, and ibuprofen for additional pain relief. Refer to D39 for restrictions. RTC in 1 week for re-evaluation, sooner for any si  gnificant changes in symptoms. Entire clinic visit was conducted using  #[id number], Ray.  1. Strain of lumbar region, initial encounter      Disability Information   Status: Released to Restricted Duty    From:  " 2019  Through: 2019 Restrictions are: Temporary   Physical Restrictions   Sitting:    Standing:    Stoopin hrs/day Bendin hrs/day   Squattin hrs/day Walking:    Climbin hrs/day Pushing:      Pulling:    Other:    Reaching Above Shoulder (L):   Reaching Above Shoulder (R):       Reaching Below Shoulder (L):    Reaching Below Shoulder (R):      Not to exceed Weight Limits   Carrying(hrs):   Weight Limit(lb): < or = to 10 pounds Lifting(hrs):   Weight  Limit(lb): < or = to 10 pounds   Comments: No pushing, pulling, lifting, carrying more than 10 pounds    Repetitive Actions   Hands: i.e. Fine Manipulations from Grasping:     Feet: i.e. Operating Foot Controls:     Driving / Operate Machinery:     Physician Name: Zehra Guaman P.A.-C. Physician Signature:   e-Signature: Dr. Tyrell Veliz, Medical Director   Clinic Name / Location: [address] [address] Clinic Phone Number: Dept: [phone]   Appointment Time: 10:40 Am Visit Start Time: 11:29 AM   Check-In Time:  11:24 Am Visit Discharge Time:  12:20 PM   Original-Treating Physician or Chiropractor    Page 2-Insurer/TPA    Page 3-Employer    Page 4-Employee

## 2019-06-12 NOTE — LETTER
"EMPLOYEE’S CLAIM FOR COMPENSATION/ REPORT OF INITIAL TREATMENT  FORM C-4    EMPLOYEE’S CLAIM - PROVIDE ALL INFORMATION REQUESTED   First Name  Olimpia Last Name  Filipe Birthdate                    1975                Sex  female Claim Number   Home Address  [address] Age  43 y.o. 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    How did this injury or occupational disease occur? (Be specific an answer in detail. Use additional sheet if necessary)  Picking up strong floor tote.   If you believe that you have an occupational disease, when did you first have knowledge of the disability and it relationship to your employment?  N/A Witnesses to the Accident  N/A      Nature of Injury or Occupational Disease  Workers' Compensation  Part(s) of Body Injured or Affected  Lower Back Area (Lumbar Area & Lumbo-Sacral), N/A, N/A    I certify that the above is true and correct to the best of my knowledge and that I have provided this information in order " to obtain the benefits of [place] of [place]).  I hereby authorize any physician, chiropractor, surgeon, practitioner, or other person, any hospital, including [facility] or [facility], any medical service organization, any insurance company, or other institution or organization to release to each other, any medical or other information, including benefits paid or payable, pertinent to this injury or disease, except information relative to diagnosis, treatment and/or counseling for AIDS, psychological conditions, alcohol or controlled substances, for which I must give specific authorization.  A Photostat of this authorization shall be as valid as the original.     Date 06/12/19   Place [facility]   Employee’s Signature   THIS REPORT MUST BE COMPLETED AND MAILED WITHIN 3 WORKING DAYS OF TREATMENT   Place  [facility]  Name of Facility  [place]   Date  6/12/2019 Diagnosis  (S39.012A) Strain of lumbar region, initial encounter Is there evidence the injured employee was under the influence of alcohol and/or another controlled substance at the time of accident?   Hour  11:29 AM Description of Injury or Disease  The encounter diagnosis was Strain of lumbar region, initial encounter. No   Treatment  Assessment/Plan:  Recommend continuing ice and APAP as it appears to be helping. Would consider trying heat, epsom salt baths, and ibuprofen for additional pain relief. Refer to D39 for restrictions. RTC in 1 week for re-evaluation, sooner for any significant changes in symptoms. Entire clinic visit was conducted using  #[id number], Ray.  1. Strain of lumbar region, initial encounter   Have you advised the patient to remain off work five days or more? No   X-Ray Findings      If Yes   From Date  To Date      From information given by the employee, together with medical evidence,  "can you directly connect this injury or occupational disease as job incurred?  Yes If No Full Duty  No Modified Duty  Yes   Is additional medical care by a physician indicated?  Yes If Modified Duty, Specify any Limitations / Restrictions  See D39   Do you know of any previous injury or disease contributing to this condition or occupational disease?                            No   Date  6/12/2019 Print Doctor’s Name Zehra Guaman P.A.-C. I certify the employer’s copy of  this form was mailed on:   Address  [address] Insurer’s Use Only     [facility]  [address]    Provider’s Tax ID Number  [id number] Telephone  Dept: [phone]            e-Signature: Dr. Tyrell Veliz, Medical Director Degree  PAC        ORIGINAL-TREATING PHYSICIAN OR CHIROPRACTOR    PAGE 2-INSURER/TPA    PAGE 3-EMPLOYER    PAGE 4-EMPLOYEE             Form C-4 (rev10/07)              BRIEF DESCRIPTION OF RIGHTS AND BENEFITS  (Pursuant to [place]RS 616C.050)    Notice of Injury or Occupational Disease (Incident Report Form C-1): If an injury or occupational disease (OD) arises out of and in the  course of employment, you must provide written notice to your employer as soon as practicable, but no later than 7 days after the accident or  OD. Your employer shall maintain a sufficient supply of the required forms.    Claim for Compensation (Form C-4): If medical treatment is sought, the form C-4 is available at the place of initial treatment. A completed  \"Claim for Compensation\" (Form C-4) must be filed within 90 days after an accident or OD. The treating physician or chiropractor must,  within 3 working days after treatment, complete and mail to the employer, the employer's insurer and third-party , the Claim for  Compensation.    Medical Treatment: If you require medical treatment for your on-the-job injury or OD, you may be required to select a physician or  chiropractor from a list provided by your " workers’ compensation insurer, if it has contracted with an Organization for Managed Care (MCO) or  Preferred Provider Organization (PPO) or providers of health care. If your employer has not entered into a contract with an MCO or PPO, you  may select a physician or chiropractor from the Panel of Physicians and Chiropractors. Any medical costs related to your industrial injury or  OD will be paid by your insurer.    Temporary Total Disability (TTD): If your doctor has certified that you are unable to work for a period of at least 5 consecutive days, or 5  cumulative days in a 20-day period, or places restrictions on you that your employer does not accommodate, you may be entitled to TTD  compensation.    Temporary Partial Disability (TPD): If the wage you receive upon reemployment is less than the compensation for TTD to which you are  entitled, the insurer may be required to pay you TPD compensation to make up the difference. TPD can only be paid for a maximum of 24  months.    Permanent Partial Disability (PPD): When your medical condition is stable and there is an indication of a PPD as a result of your injury or  OD, within 30 days, your insurer must arrange for an evaluation by a rating physician or chiropractor to determine the degree of your PPD. The  amount of your PPD award depends on the date of injury, the results of the PPD evaluation and your age and wage.    Permanent Total Disability (PTD): If you are medically certified by a treating physician or chiropractor as permanently and totally disabled  and have been granted a PTD status by your insurer, you are entitled to receive monthly benefits not to exceed 66 2/3% of your average  monthly wage. The amount of your PTD payments is subject to reduction if you previously received a PPD award.    Vocational Rehabilitation Services: You may be eligible for vocational rehabilitation services if you are unable to return to the job due to a  permanent physical  impairment or permanent restrictions as a result of your injury or occupational disease.    Transportation and Per Bridget Reimbursement: You may be eligible for travel expenses and per bridget associated with medical treatment.    Reopening: You may be able to reopen your claim if your condition worsens after claim closure.    Appeal Process: If you disagree with a written determination issued by the insurer or the insurer does not respond to your request, you may  appeal to the Department of Administration, , by following the instructions contained in your determination letter. You must  appeal the determination within 70 days from the date of the determination letter at [address], [address], [place], [place]  [address], or [address], [address], [place] [address]. If you disagree with the  decision, you may appeal to the  Department of Administration, . You must file your appeal within 30 days from the date of the  decision  letter at [address], [address], [place], [place] [address], or [address], [address], [place] [address]. If you  disagree with a decision of an , you may file a petition for judicial review with the District Court. You must do so within 30  days of the Appeal Officer’s decision. You may be represented by an  at your own expense or you may contact the [facility] for possible  representation.    [place]  for Injured Workers (NAIW): If you disagree with a  decision, you may request that NAIW represent you  without charge at an  Hearing. For information regarding denial of benefits, you may contact the [facility] at: [address], [address], [place] [address], [phone]) [phone], or [address], [address], [place] [address], [phone]    To File a Complaint with the Division: If you wish to file a complaint with  the  of the Division of Industrial Relations (DIR),  please contact the Workers’ Compensation Section, [address], [address], [place], [place] [address], telephone (530) [phone], or  [address], [address], [place], [place] [address], telephone [phone].    For assistance with Workers’ Compensation Issues: you may contact the Office of the Governor Consumer Health Assistance, [address] [place] [address], [address], [place], [place] [address], Toll Free 1-171.875.5969, Web site: http://[employer].[facility].nv., E-mail  [email or web address].[email or web address].                                                                                                                                                                                                                                   __________________________________________________________________                                                                   _____06/12/19____________                Employee Name / Signature                                                                                                                                                       Date                                                                                                                                                                                                     D-2 (rev. 10/07)

## 2019-06-12 NOTE — PROGRESS NOTES
"Chief Complaint   Patient presents with   • Work-Related Injury     New  for Lower back injury/ DOI: 6/10/19/ [employer]        HISTORY OF PRESENT ILLNESS: Patient is a 43 y.o. female who presents today for the following:    DOI: 6/10/19, in the afternoon  Patient was lifting a very heavy tote when she developed mild pain across the entire low back. Worsening pain yesterday while working. Denies saddle anesthesia, bowel/bladder incontinence, extremity weakness.  Triggers: sitting, bending over  Relievers: standing improves pain slightly; \"fetal position\"; cold compress  Other places of employment: none  History of back issues: none  OTC Meds tried: APAP helps a little    Patient Active Problem List    Diagnosis Date Noted   • Family history of breast cancer in first degree relative 02/24/2017   • Healthcare maintenance 02/24/2017   • Essential hypertension 01/26/2017       Allergies:Patient has no known allergies.    Current Outpatient Prescriptions Ordered in [facility]   Medication Sig Dispense Refill   • lisinopril (PRINIVIL) 5 MG Tab TAKE 1 TABLET BY MOUTH EVERY DAY 90 Tab 1   • benzonatate (TESSALON) 200 MG capsule Take 1 Cap by mouth 3 times a day as needed for Cough. (Patient not taking: Reported on 6/12/2019) 60 Cap 0     No current Epic-ordered facility-administered medications on file.        Past Medical History:   Diagnosis Date   • Hypertension        Social History   Substance Use Topics   • Smoking status: Never Smoker   • Smokeless tobacco: Never Used   • Alcohol use No       No family status information on file.   No family history on file.    Review of Systems:   Constitutional ROS: No unexpected change in weight, No weakness, No fatigue  Musculoskeletal/Extremities ROS: + LBP.  Hematologic/Lymphatic ROS: No chills, No night sweats, No weight loss  Skin/Integumentary ROS: No edema, No evidence of rash, No itching      Exam:  /80   Pulse 78   Temp 36.9 °C (98.4 °F) (Temporal)   Resp 16   Ht 1.626 m " "(5' 4\")   Wt 61.2 kg (135 lb)   SpO2 97%   General: Well developed, well nourished. No distress.  Pulmonary: Unlabored respiratory effort.  Back: Decreased ROM in all planes due to pain.  Extremities: No motor/sensory deficit noted BLE. Strong plantar flexion/dorsiflexion.  Neurologic: Grossly nonfocal. No facial asymmetry noted.  Skin: Warm, dry, good turgor. No rashes in visible areas.   Psych: Normal mood. Alert and oriented x3. Judgment and insight is normal.    Assessment/Plan:  Recommend continuing ice and APAP as it appears to be helping. Would consider trying heat, epsom salt baths, and ibuprofen for additional pain relief. Refer to D39 for restrictions. RTC in 1 week for re-evaluation, sooner for any significant changes in symptoms. Entire clinic visit was conducted using  #[id number], Ray.  1. Strain of lumbar region, initial encounter         "

## 2019-06-17 ENCOUNTER — OCCUPATIONAL MEDICINE (OUTPATIENT)
Dept: URGENT CARE | Facility: PHYSICIAN GROUP | Age: 44
End: 2019-06-17
Payer: COMMERCIAL

## 2019-06-17 VITALS
HEART RATE: 84 BPM | OXYGEN SATURATION: 98 % | BODY MASS INDEX: 23.22 KG/M2 | DIASTOLIC BLOOD PRESSURE: 70 MMHG | HEIGHT: 64 IN | WEIGHT: 136 LBS | TEMPERATURE: 98.2 F | SYSTOLIC BLOOD PRESSURE: 128 MMHG | RESPIRATION RATE: 16 BRPM

## 2019-06-17 DIAGNOSIS — S39.012D STRAIN OF LUMBAR REGION, SUBSEQUENT ENCOUNTER: ICD-10-CM

## 2019-06-17 PROCEDURE — 99213 OFFICE O/P EST LOW 20 MIN: CPT | Mod: 29 | Performed by: PHYSICIAN ASSISTANT

## 2019-06-17 NOTE — PROGRESS NOTES
"Chief Complaint   Patient presents with   • Follow-Up     W/C follow-up back is feeling much bettter       HISTORY OF PRESENT ILLNESS: Patient is a 43 y.o. female who presents today for the following:    DOI: 6/10/19, second visit  Patient was lifting a very heavy tote when she developed mild pain across the entire low back.  Patient states she is feeling much better today, denying any pain at all.  She is ready to go back to work without restrictions.  Other places of employment: none  History of back issues: none  OTC Meds tried: APAP helps a little    Patient Active Problem List    Diagnosis Date Noted   • Family history of breast cancer in first degree relative 02/24/2017   • Healthcare maintenance 02/24/2017   • Essential hypertension 01/26/2017       Allergies:Patient has no known allergies.    Current Outpatient Prescriptions Ordered in [facility]   Medication Sig Dispense Refill   • lisinopril (PRINIVIL) 5 MG Tab TAKE 1 TABLET BY MOUTH EVERY DAY 90 Tab 1   • benzonatate (TESSALON) 200 MG capsule Take 1 Cap by mouth 3 times a day as needed for Cough. (Patient not taking: Reported on 6/12/2019) 60 Cap 0     No current Epic-ordered facility-administered medications on file.        Past Medical History:   Diagnosis Date   • Hypertension        Social History   Substance Use Topics   • Smoking status: Never Smoker   • Smokeless tobacco: Never Used   • Alcohol use No       No family status information on file.   No family history on file.    Review of Systems:    Constitutional ROS: No unexpected change in weight, No weakness, No fatigue    Exam:  /70   Pulse 84   Temp 36.8 °C (98.2 °F)   Resp 16   Ht 1.626 m (5' 4\")   Wt 61.7 kg (136 lb)   SpO2 98%   General: Well developed, well nourished. No distress.  Pulmonary: Unlabored respiratory effort.   Neurologic: Grossly nonfocal. No facial asymmetry noted.  Back: Full range of motion.  Normal gait.  Skin: Warm, dry, good turgor. No rashes in visible areas. "   Psych: Normal mood. Alert and oriented x3. Judgment and insight is normal.    Assessment/Plan:  Return to work without restrictions.  Follow-up around 6/28/2019 for reevaluation and possible discharge/MMI, sooner for any significant changes in symptoms.    Entire visit was conducted using  #[id number].  1. Strain of lumbar region, subsequent encounter

## 2019-06-17 NOTE — LETTER
[address] [place] [address]  Phone:  [phone] - Fax:  [phone]   [facility] Progress Report and Disability Certification  Date of Service: 6/17/2019   No Show:  No  Date / Time of Next Visit: 6/28/2019   Claim Information   Patient Name: Olimpia Dent  Claim Number:     Employer: [employer] Date of Injury: 6/10/2019     Insurer / TPA: [employer]  ID / SSN:     Occupation: Picking   Diagnosis: The encounter diagnosis was Strain of lumbar region, subsequent encounter.    Medical Information   Related to Industrial Injury? Yes    Subjective Complaints:  DOI: 6/10/19, second visit  Patient was lifting a very heavy tote when she developed mild pain across the entire low back.  Patient states she is feeling much better today, denying any pain at all.  She is ready to go back to work without restrictions.   Objective Findings:     Pre-Existing Condition(s):     Assessment:   Condition Improved    Status: Additional Care Required  Permanent Disability:No    Plan:      Diagnostics:      Comments:       Disability Information   Status: Released to Full Duty    From:  6/17/2019  Through: 6/28/2019 Restrictions are:     Physical Restrictions   Sitting:    Standing:    Stooping:    Bending:      Squatting:    Walking:    Climbing:    Pushing:      Pulling:    Other:    Reaching Above Shoulder (L):   Reaching Above Shoulder (R):       Reaching Below Shoulder (L):    Reaching Below Shoulder (R):      Not to exceed Weight Limits   Carrying(hrs):   Weight Limit(lb):   Lifting(hrs):   Weight  Limit(lb):     Comments:      Repetitive Actions   Hands: i.e. Fine Manipulations from Grasping:     Feet: i.e. Operating Foot Controls:     Driving / Operate Machinery:     Physician Name: Milagro Guaman P.A.-C. Physician Signature: MILAGRO Kinney P.A.-C. e-Signature: Dr. Tyrell Veliz, Medical Director   Clinic Name  / Location: [facility]  [place] [address] Clinic Phone Number: Dept: [phone]   Appointment Time: 8:50 Am Visit Start Time: 8:58 AM   Check-In Time:  8:51 Am Visit Discharge Time:  10:12 a.m.   Original-Treating Physician or Chiropractor    Page 2-Insurer/TPA    Page 3-Employer    Page 4-Employee